# Patient Record
Sex: FEMALE | Race: WHITE | NOT HISPANIC OR LATINO | Employment: FULL TIME | URBAN - METROPOLITAN AREA
[De-identification: names, ages, dates, MRNs, and addresses within clinical notes are randomized per-mention and may not be internally consistent; named-entity substitution may affect disease eponyms.]

---

## 2021-01-09 ENCOUNTER — OFFICE VISIT (OUTPATIENT)
Dept: URGENT CARE | Facility: URGENT CARE | Age: 26
End: 2021-01-09
Payer: COMMERCIAL

## 2021-01-09 VITALS
HEIGHT: 62 IN | TEMPERATURE: 97.6 F | SYSTOLIC BLOOD PRESSURE: 115 MMHG | DIASTOLIC BLOOD PRESSURE: 80 MMHG | HEART RATE: 68 BPM | OXYGEN SATURATION: 98 % | BODY MASS INDEX: 22.63 KG/M2 | WEIGHT: 123 LBS

## 2021-01-09 DIAGNOSIS — H61.23 BILATERAL IMPACTED CERUMEN: Primary | ICD-10-CM

## 2021-01-09 PROCEDURE — 69209 REMOVE IMPACTED EAR WAX UNI: CPT | Mod: 50 | Performed by: PHYSICIAN ASSISTANT

## 2021-01-09 ASSESSMENT — MIFFLIN-ST. JEOR: SCORE: 1243.23

## 2021-01-09 NOTE — PROGRESS NOTES
Patient presents with:  Urgent Care: Pt in clinic to have eval for ear pain.  Otalgia        (H61.23) Bilateral impacted cerumen  (primary encounter diagnosis)  Comment:   Plan: OR REMOVAL IMPACTED CERUMEN IRRIGATION/LVG         UNILAT                SUBJECTIVE:   Jamila Stroud is a 25 year old female  presenting with a chief complaint of left ear pain onset 2 weeks ago after using ear plugs.  Denies any ear drainage, runny nose, sore throat, fever, cough, alteration in taste or smell, nausea, vomiting or diarrhea.      No ill contacts.      She is an established patient of [facility].  Onset of symptoms was 2 week(s) ago.  Course of illness is worsening.    Severity moderate  Current and Associated symptoms: as above  Treatment measures tried include Tylenol/Ibuprofen.  Predisposing factors include None.    No past medical history on file.  Current Outpatient Medications   Medication Sig Dispense Refill     Multiple Vitamins-Iron (DAILY-SHARI/IRON/BETA-CAROTENE) TABS TAKE 1 TABLET BY MOUTH DAILY. (Patient not taking: Reported on 10/19/2020) 30 tablet 7     Social History     Tobacco Use     Smoking status: Never Smoker     Smokeless tobacco: Never Used   Substance Use Topics     Alcohol use: Not on file     Family History   Problem Relation Age of Onset     Diabetes Mother      Diabetes Father          ROS:    10 point ROS of systems including Constitutional, Eyes, Respiratory, Cardiovascular, Gastroenterology, Genitourinary, Integumentary, Muscularskeletal, Psychiatric ,neurological were all negative except for pertinent positives noted in my HPI     CONSTITUTIONAL:NEGATIVE for fever, chills, change in weight  INTEGUMENTARY/SKIN: NEGATIVE for worrisome rashes, moles or lesions  EYES: NEGATIVE for vision changes or irritation  ENT/MOUTH: as per HPI  RESP:NEGATIVE for significant cough or SOB  CV: NEGATIVE for chest pain, palpitations or peripheral edema  GI: NEGATIVE for nausea, abdominal pain, heartburn, or  "change in bowel habits  MUSCULOSKELETAL: NEGATIVE for significant arthralgias or myalgia  NEURO: NEGATIVE for weakness, dizziness or paresthesias  ENDOCRINE: NEGATIVE for temperature intolerance, skin/hair changes  Review of systems negative except as stated above.    OBJECTIVE:  /80   Pulse 68   Temp 97.6  F (36.4  C) (Oral)   Ht 1.562 m (5' 1.5\")   Wt 55.8 kg (123 lb)   LMP 12/18/2020   SpO2 98%   BMI 22.86 kg/m    GENERAL APPEARANCE: healthy, alert and no distress  PRIOR to ear lavage by nursing, both ear canals have soft brown cerumen, on left the TM is completely occluded.    After lavage by nursing the exam is as follows:  HENT: ear canals and TM's normal.  Nose and mouth without ulcers, erythema or lesions  SKIN: no suspicious lesions or rashes  Physical Exam        "